# Patient Record
Sex: FEMALE | Race: WHITE | Employment: FULL TIME | URBAN - METROPOLITAN AREA
[De-identification: names, ages, dates, MRNs, and addresses within clinical notes are randomized per-mention and may not be internally consistent; named-entity substitution may affect disease eponyms.]

---

## 2018-11-27 ENCOUNTER — HOSPITAL ENCOUNTER (OUTPATIENT)
Dept: PHYSICAL THERAPY | Age: 38
Discharge: HOME | End: 2018-11-27
Payer: SELF-PAY

## 2018-11-27 PROCEDURE — 97110 THERAPEUTIC EXERCISES: CPT | Performed by: PHYSICAL THERAPIST

## 2018-11-27 PROCEDURE — 97161 PT EVAL LOW COMPLEX 20 MIN: CPT | Performed by: PHYSICAL THERAPIST

## 2018-11-27 NOTE — PROGRESS NOTES
[facility] at [address]: [phone]   Fax: [phone] PLAN OF CARE / STATEMENT OF MEDICAL NECESSITY FOR PHYSICAL THERAPY SERVICES Patient Name: Nano Chapman : 1980 Medical  
Diagnosis: Left shoulder pain [M25.512] Treatment Diagnosis: Left shoulder pain Onset Date: 5 months Referral Source: Referred, Self, MD Start of Care [facility]): 2018 Prior Hospitalization: See medical history Provider #: [id number] Prior Level of Function: Walking for exercise, independent w/ ADLs Comorbidities: unremarkable Medications: Verified on Patient Summary List  
The Plan of Care and following information is based on the information from the initial evaluation.  
=========================================================================================== Assessment / gauthier information:  Nano Chapman is a 43 y.o.  yo female presents with signs/symptoms of left rotator cuff tendinopathy, pain reduced and ROM increased with adduction isometric. 
  
Patient will continue to benefit from skilled PT services to modify and progress therapeutic interventions, address functional mobility deficits, address ROM deficits, address strength deficits, analyze and address soft tissue restrictions, analyze and cue movement patterns, analyze and modify body mechanics/ergonomics and assess and modify postural abnormalities to attain remaining goals. .   
Pt instructed in HEP and will f/u in clinic for PT. 
=========================================================================================== Eval Complexity: History LOW Complexity : Zero comorbidities / personal factors that will impact the outcome / POC;  Examination  LOW Complexity : 1-2 Standardized tests and measures addressing body structure, function, activity limitation and / or participation in recreation ; Presentation LOW Complexity : Stable, uncomplicated ;  Decision Making MEDIUM Complexity : FOTO score of 26-74; Overall Complexity LOW Problem List: pain affecting function, decrease ROM, decrease strength, decrease ADL/ functional abilitiies, decrease activity tolerance and decrease flexibility/ joint mobility Treatment Plan may include any combination of the following: Therapeutic exercise, Therapeutic activities, Neuromuscular re-education, Physical agent/modality, Manual therapy, Aquatic therapy and Patient education Patient / Family readiness to learn indicated by: asking questions, trying to perform skills and interestPersons(s) to be included in education: patient (P) Barriers to Learning/Limitations: no 
Patient Goal (s): Regular use of my left arm and to be able to sleep on my left side Patient self reported health status: good Rehabilitation Potential: good Goals: 
Short Term Goals: To be accomplished in 2 weeks: 
 Patient will report compliance with HEP at least 1x/day to aid in rehabilitation program. 
 Status at IE: NA 
 Current: 
 
 Patient will display full pain free AROM into flexion and ER  to aid in completion of ADLs. Status at IE: 160 flexion and limited ER in supine Current: 
 
 
Long Term Goals: To be accomplished in 6 weeks: 
 Patient will report compliance with HEP a least 3-4x/week to aid in rehabilitation/strengthening program. 
 Status at IE: NA 
 Current: 
 
 Patient will report no pain greater than 1-2/10 with overhead activities to aid in completion of ADLs. Status at IE: 7/10 Current: 
 
 Patient will increase B UE strength to 5/5 throughout all planes to aid in completion of ADLs. Status at IE:4/5 in B UE, 4-/5 in shoulder ext Current: 
 
 Patient will increase FOTO score to 71 points overall to demonstrate improvement in functional status. Status at IE: 48 Current: 
 
 
Frequency / Duration:   Patient to be seen 1  times per week for 6  weeks: 
Patient / Caregiver education and instruction: self care and exercises G-Codes (GP): NA 
 .Therapist Signature: Natalia Pardo DPT, OCS Date: 11/27/2018 Certification Period: NA Time: 9:08 AM  
=========================================================================================== I certify that the above Physical Therapy Services are being furnished while the patient is under my care. I agree with the treatment plan and certify that this therapy is necessary. Physician Signature:       Date:      Time:  Please sign and return to [facility] or you may fax the signed copy to [phone]. Thank you.

## 2018-11-27 NOTE — PROGRESS NOTES
PT DAILY TREATMENT NOTE/SHOULDER EVAL 10-18 Patient Name: eLnny Maharaj Date:2018 : 1980 [x]  Patient  Verified Payor: SELF PAY / Plan: [facility] SELF PAY FLAT RATE / Product Type: Self Pay / In time:8:00  Out time:9:00 Total Treatment Time (min): 60 Visit #: 1 of 6 Medicare/BCBS Only Total Timed Codes (min):  30 1:1 Treatment Time:  30 Treatment Area: Left shoulder pain [M25.512] SUBJECTIVE Pain Level (0-10 scale): 7 
[]constant []intermittent []improving []worsening []no change since onset Any medication changes, allergies to medications, adverse drug reactions, diagnosis change, or new procedure performed?: [x] No    [] Yes (see summary sheet for update) Subjective functional status/changes:    
Patient has CC of left shoulder pain since . ABDIEL: maybe leaning onto the left shoulder. Patient describes pain as dull, ache constant. No diurnal features Denies numbness/tingling. Denies popping/clicking. Aggravating factors:extension ER, rotational movement . Alleviating factors: ice, rest.  Denies red flags: SOB, chest pain, dizziness/lightheadedness, blurred/double vision, HA, chills/fevers, night sweats, change in bowel/bladder control, abdominal pain, difficulty swallowing, slurred speech, unexplained weight gain/loss, nausea, vomiting. PMHx: unremarkable . Surgical Hx: none. Social Hx: 2 home,denies alcohol, tobacco, work status. PLOF: walking. CLOF: same. Diagnostic Imaging: x-ray unremarkable OBJECTIVE/EXAMINATION 
 
30 min [x]Eval                  []Re-Eval  
 
 
30 min Therapeutic Exercise:  [x] See flow sheet :  
Rationale: increase ROM, increase strength and decrease pain to improve the patients ability to complete ADLs With 
 [] TE 
 [] TA 
 [] neuro 
 [] other: Patient Education: [x] Review HEP [] Progressed/Changed HEP based on:  
[] positioning   [] body mechanics   [] transfers   [] heat/ice application   
[] other: Physical Therapy Evaluation - Shoulder Posture: [] Poor    [x] Fair    [] Good    Describe: 
 
ROM:  [] Unable to assess at this time AROM                                                              PROM Left Right  Left Right Flexion 160 160 Flexion Extension 50 70 Extension Scaption/ 160 Scaptin/ABD    
ER @ 0 Degrees   ER @ 0 Degrees ER @ 90 Degrees   ER @ 85 KNOTRBW 69 98 IR @ 90 Degrees   IR @ 90 Degrees 70 70 End Feel / Painful Arc: 
 
UE Strength:   [] Unable to assess at this time L (1-5) R (1-5) Pain Flexion 4 4+ [] Yes   [] No  
Abduction 4 4+ [] Yes   [] No  
ER 4 4+ [] Yes   [] No  
IR 4 4+ [] Yes   [] No  
Extension 4 4+ [] Yes   [] No  
Elbow flexion 4 4+ [] Yes   [] No  
Elbow Ext 4- 4- [] Yes   [] No  
Wrist Flexion 4 4+ [] Yes   [] No  
Wrist Extension 4 4+ [] Yes   [] No  
Thumb extension 4 4+ [] Yes   [] No  
Finger Abduction 4 4+ [] Yes   [] No  
 
 
Scapulohumoral Control / Rhythm: 
Able to eccentrically lower with good control? Left: [] Yes   [] No     Right: [] Yes   [] No 
 
Accessory Motions: 
 
Palpation 
[x] Min  [] Mod  [] Severe    Location: bicep tendon 
[] Min  [] Mod  [] Severe    Location: 
 
Optional Tests: 
Neer's Test  [] Pos   [x] Neg  
Hawkin's Test  [] Pos   [x] Neg  
Speed's Test  [] Pos   [x] Neg  
Empty Can  [x] Pos   [] Neg Anterior Apprehension [] Pos   [x] Neg Other Tests / Comments:  
  
 
Pain Level (0-10 scale) post treatment: 5 
 
ASSESSMENT/Changes in Function: Patient presents with signs/symtoms of left rotator cuff tendinopathy, pain reduced and ROM increased with adduction isometric.  
 
Patient will continue to benefit from skilled PT services to modify and progress therapeutic interventions, address functional mobility deficits, address ROM deficits, address strength deficits, analyze and address soft tissue restrictions, analyze and cue movement patterns, analyze and modify body mechanics/ergonomics and assess and modify postural abnormalities to attain remaining goals. [x]  See Plan of Care 
[]  See progress note/recertification 
[]  See Discharge Summary Progress towards goals / Updated goals: 
See POC PLAN 
[]  Upgrade activities as tolerated     [x]  Continue plan of care 
[]  Update interventions per flow sheet      
[]  Discharge due to:_ 
[]  Other:_ Katy De La Paz PT, DPT 11/27/2018  8:08 AM

## 2018-12-05 ENCOUNTER — HOSPITAL ENCOUNTER (OUTPATIENT)
Dept: PHYSICAL THERAPY | Age: 38
End: 2018-12-05

## 2018-12-19 ENCOUNTER — APPOINTMENT (OUTPATIENT)
Dept: PHYSICAL THERAPY | Age: 38
End: 2018-12-19

## 2019-09-11 NOTE — PROGRESS NOTES
[facility] at [address]: [phone]   Fax: [phone]  Discharge Summary  Patient Name: Kin Spurling : 1980   Medical   Diagnosis: Left shoulder pain [M25.512] Treatment Diagnosis: Left shoulder pain   Onset Date: 5 months     Referral Source: Referred, Self, MD Start of Care [facility]): 2018   Prior Hospitalization: See medical history Provider #: [id number]   Prior Level of Function: Walking for exercise, independent w/ ADLs   Comorbidities: unremarkable   Medications: Verified on Patient Summary List      ===========================================================================================  Assessment / Summary of Care:  Kin Spurling is a 35 y.o.  yo female with left shoulder pain. Patient has not returned to clinic in 30 days. Unable to perform formal assessment on patient as a result.     ===========================================================================================    Plan: Discharge to [facility].     Goals: Unable to reassess.    ===========================================================================================  Subjective: NA      Objective: NA    Therapist Signature: Darian Goldman PT DPT, OCS Date: 2019     Time: 6:07 PM